# Patient Record
(demographics unavailable — no encounter records)

---

## 2025-06-13 NOTE — ASSESSMENT
[FreeTextEntry1] : C/w donut pillow Discussed that healing will take up to 3-6 months Rx given for tramadol f/u 2 months to eval healing

## 2025-06-13 NOTE — HISTORY OF PRESENT ILLNESS
[Sharp] : sharp [Sitting] : sitting [de-identified] : 06/13/2025 CALDERON HANCOCK is a 48-year y/o M here for an evaluation of His tailbone. Reports that he fell yesterday, visited [facility], has XRs done and they concluded a FX of tailbone. Pain when sitting where pain travels into medial coccyx. Has been taking meloxicam and Percocet with relief  [] : no

## 2025-06-13 NOTE — IMAGING
[Fracture] : Fracture [de-identified] : Sacrum/coccyx  Non focal  ecchymosis gluteal cleft TTP L/S junction   [FreeTextEntry2] : Suspected lucency